# Patient Record
Sex: FEMALE | Race: WHITE | Employment: OTHER | URBAN - METROPOLITAN AREA
[De-identification: names, ages, dates, MRNs, and addresses within clinical notes are randomized per-mention and may not be internally consistent; named-entity substitution may affect disease eponyms.]

---

## 2017-03-20 DIAGNOSIS — Z12.39 SCREENING FOR BREAST CANCER: Primary | ICD-10-CM

## 2017-05-12 DIAGNOSIS — Z12.39 SCREENING FOR BREAST CANCER: ICD-10-CM

## 2017-12-18 ENCOUNTER — OFFICE VISIT (OUTPATIENT)
Dept: OBGYN | Age: 62
End: 2017-12-18
Payer: MEDICARE

## 2017-12-18 ENCOUNTER — HOSPITAL ENCOUNTER (OUTPATIENT)
Age: 62
Setting detail: SPECIMEN
Discharge: HOME | End: 2017-12-18
Payer: MEDICARE

## 2017-12-18 VITALS
BODY MASS INDEX: 34.47 KG/M2 | SYSTOLIC BLOOD PRESSURE: 138 MMHG | HEIGHT: 59 IN | DIASTOLIC BLOOD PRESSURE: 78 MMHG | WEIGHT: 171 LBS

## 2017-12-18 DIAGNOSIS — Z01.419 WOMEN'S ANNUAL ROUTINE GYNECOLOGICAL EXAMINATION: ICD-10-CM

## 2017-12-18 DIAGNOSIS — Z01.419 WOMEN'S ANNUAL ROUTINE GYNECOLOGICAL EXAMINATION: Primary | ICD-10-CM

## 2017-12-18 PROCEDURE — G0145 SCR C/V CYTO,THINLAYER,RESCR: HCPCS

## 2017-12-18 PROCEDURE — G0101 CA SCREEN;PELVIC/BREAST EXAM: HCPCS | Performed by: OBSTETRICS & GYNECOLOGY

## 2017-12-18 ASSESSMENT — PATIENT HEALTH QUESTIONNAIRE - PHQ9
SUM OF ALL RESPONSES TO PHQ QUESTIONS 1-9: 0
1. LITTLE INTEREST OR PLEASURE IN DOING THINGS: 0
2. FEELING DOWN, DEPRESSED OR HOPELESS: 0
SUM OF ALL RESPONSES TO PHQ9 QUESTIONS 1 & 2: 0

## 2017-12-18 NOTE — PROGRESS NOTES
MEDICARE PHYSICAL    Date of service: 2017    Rica Spatz  Is a 58 y.o.  female    PT's PCP is: No primary care provider on file. : 1955     HIGH RISK ASSESSMENT    Maternal NORMAN Exposure (in utero): No    Sexual activity < 16 yrs of age: No    Greater than 5 sexual partners: No    3 abnormal paps in the last 7 years: No    History of any STD (including HIV): No                                        Subjective:       No LMP recorded.  Patient is postmenopausal.     Are your menses regular: not applicable    OB History    Para Term  AB Living   2 2 2         SAB TAB Ectopic Molar Multiple Live Births                    # Outcome Date GA Lbr José Miguel/2nd Weight Sex Delivery Anes PTL Lv   2 Term 46 38w0d   F CS-Unspec      1 Term 12 37w0d   F CS-Unspec              History   Smoking Status    Current Every Day Smoker    Packs/day: 1.00    Years: 30.00   Smokeless Tobacco    Never Used        History   Alcohol Use No       Allergies: Codeine; Morphine and related; and Rofecoxib      Current Outpatient Prescriptions:     LINZESS 290 MCG CAPS capsule, TAKE 1 CAPSULE BY MOUTH DAILY, Disp: , Rfl: 5    PREVNAR 13 SUSP inj, ADMINISTERED AT DDM, Disp: , Rfl: 0    temazepam (RESTORIL) 15 MG capsule, , Disp: , Rfl: 0    baclofen (LIORESAL) 10 MG tablet, , Disp: , Rfl: 0    buPROPion (WELLBUTRIN XL) 300 MG XL tablet, , Disp: , Rfl: 0    losartan (COZAAR) 50 MG tablet, , Disp: , Rfl:     meloxicam (MOBIC) 15 MG tablet, , Disp: , Rfl: 0    ZOSTAVAX 03558 UNT/0.65ML injection, , Disp: , Rfl: 0    famotidine (PEPCID) 20 MG tablet, , Disp: , Rfl:     levothyroxine (SYNTHROID) 75 MCG tablet, , Disp: , Rfl:     pravastatin (PRAVACHOL) 40 MG tablet, , Disp: , Rfl:     clomiPRAMINE (ANAFRANIL) 50 MG capsule, Take 50 mg by mouth nightly., Disp: , Rfl:     ALPRAZolam (XANAX) 0.25 MG tablet, Take 0.25 mg by mouth nightly as needed for Sleep., Disp: , Rfl:     aspirin 81 MG tablet, Take 81 mg by mouth daily. , Disp: , Rfl:     SYMBICORT 160-4.5 MCG/ACT AERO, INHALE 2 PUFFS TWICE DAILY, Disp: , Rfl: 5    lidocaine (XYLOCAINE) 5 % ointment, , Disp: , Rfl:     vitamin D (CHOLECALCIFEROL) 1000 UNIT TABS tablet, Take 1,000 Units by mouth daily. , Disp: , Rfl:     History   Sexual Activity    Sexual activity: Not on file       Any bleeding or pain with intercourse: No    Last Yearly: 2016    Last Mammogram: 2017    Last Dexascan:unknown    Do you do self breast exams: Yes    Past Medical History:   Diagnosis Date    Anxiety and depression     High cholesterol     Hypertension     Thyroid disease        Past Surgical History:   Procedure Laterality Date     SECTION      CHOLECYSTECTOMY      KNEE SURGERY  2016    LAPAROSCOPY         History reviewed. No pertinent family history. Any family history of breast or ovarian cancer: No    Any family history of blood clots: No      Chief Complaint   Patient presents with    Gynecologic Exam        Nurse:ARCHANA    PE:  Vital Signs  Blood pressure 138/78, height 4' 11\" (1.499 m), weight 171 lb (77.6 kg). Labs:    No results found for this visit on 17. HPI: The patient is here today for a yearly exam.  She is having no problems or complaints at this time. NoPT denies fever, chills, nausea and vomiting       Objective  Lymphatic:   no lymphadenopathy  Heent:   negative   Cor: regular rate and rhythm, no murmurs              Pul:clear to auscultation bilaterally- no wheezes, rales or rhonchi, normal air movement, no respiratory distress      GI: Abdomen soft, non-tender.  BS normal. No masses,  No organomegaly           Extremities: normal strength, tone, and muscle mass   Breasts: Breast:normal appearance, no masses or tenderness, Inspection negative, No nipple retraction or dimpling, No nipple discharge or bleeding, No axillary or supraclavicular adenopathy, Normal to palpation without dominant masses   Pelvic Exam: GENITAL/URINARY:  External Genitalia:  General appearance; normal, Hair distribution; normal, Lesions absent  Vagina:  General appearance normal, Discharge absent, Lesions absent, Pelvic support normal, vagina very narrowed  Cervix:  General appearance normal, Lesions absent, Discharge absent, Tenderness absent, Enlargement absent, Nodularity absent  Uterus:  Size normal, Contour normal, Position normal, Masses absent, Consistency; normal, Support normal, Tenderness absent  Adenexa: Masses absent, Tenderness absent, Enlargement absent, Nodularity absent                                    Vaginal discharge: no vaginal discharge                            She was also counseled on her preventative health maintenance recommendations and follow-up. Assessment and Plan       1. Women's annual routine gynecological examination  PAP SMEAR             I am having Ms. Hunter maintain her famotidine, levothyroxine, pravastatin, vitamin D, clomiPRAMINE, ALPRAZolam, aspirin, baclofen, buPROPion, losartan, meloxicam, ZOSTAVAX, SYMBICORT, lidocaine, LINZESS, PREVNAR 13, and temazepam.    Return in about 3 years (around 12/18/2020). There are no Patient Instructions on file for this visit.        Aruna Skaggs,12/18/2017 4:20 PM

## 2017-12-29 LAB — CYTOLOGY REPORT: NORMAL

## 2019-04-29 DIAGNOSIS — Z12.39 SCREENING FOR BREAST CANCER: Primary | ICD-10-CM

## 2019-07-03 DIAGNOSIS — Z12.39 SCREENING FOR BREAST CANCER: ICD-10-CM

## 2021-09-09 ENCOUNTER — OFFICE VISIT (OUTPATIENT)
Dept: OBGYN | Age: 66
End: 2021-09-09
Payer: COMMERCIAL

## 2021-09-09 VITALS
SYSTOLIC BLOOD PRESSURE: 132 MMHG | WEIGHT: 178 LBS | DIASTOLIC BLOOD PRESSURE: 84 MMHG | HEIGHT: 59 IN | BODY MASS INDEX: 35.88 KG/M2

## 2021-09-09 DIAGNOSIS — B35.6 TINEA CRURIS: Primary | ICD-10-CM

## 2021-09-09 PROCEDURE — 99213 OFFICE O/P EST LOW 20 MIN: CPT | Performed by: OBSTETRICS & GYNECOLOGY

## 2021-09-09 RX ORDER — NYSTATIN 100000 [USP'U]/G
POWDER TOPICAL
Qty: 1 EACH | Refills: 5 | Status: SHIPPED | OUTPATIENT
Start: 2021-09-09 | End: 2022-09-08

## 2021-09-09 RX ORDER — FLUCONAZOLE 100 MG/1
100 TABLET ORAL DAILY
Qty: 14 TABLET | Refills: 0 | Status: SHIPPED | OUTPATIENT
Start: 2021-09-09 | End: 2021-09-23

## 2021-09-09 NOTE — PROGRESS NOTES
PROBLEM VISIT     Date of service: 2021    Piter Mora  Is a 77 y.o.  female    PT's PCP is: Rochelle Liu MD     : 1955                                             Subjective:       No LMP recorded. Patient is postmenopausal.   OB History    Para Term  AB Living   2 2 2         SAB TAB Ectopic Molar Multiple Live Births                    # Outcome Date GA Lbr José Miguel/2nd Weight Sex Delivery Anes PTL Lv   2 Term 46 38w0d   F CS-Unspec      1 Term 12 37w0d   F CS-Unspec           Social History     Tobacco Use   Smoking Status Current Every Day Smoker    Packs/day: 1.00    Years: 30.00    Pack years: 30.00   Smokeless Tobacco Never Used        Social History     Substance and Sexual Activity   Alcohol Use No       Allergies: Codeine, Morphine and related, and Rofecoxib      Current Outpatient Medications:     LINZESS 290 MCG CAPS capsule, TAKE 1 CAPSULE BY MOUTH DAILY, Disp: , Rfl: 5    temazepam (RESTORIL) 15 MG capsule, , Disp: , Rfl: 0    baclofen (LIORESAL) 10 MG tablet, , Disp: , Rfl: 0    buPROPion (WELLBUTRIN XL) 300 MG XL tablet, , Disp: , Rfl: 0    famotidine (PEPCID) 20 MG tablet, , Disp: , Rfl:     levothyroxine (SYNTHROID) 75 MCG tablet, , Disp: , Rfl:     pravastatin (PRAVACHOL) 40 MG tablet, , Disp: , Rfl:     clomiPRAMINE (ANAFRANIL) 50 MG capsule, Take 50 mg by mouth nightly., Disp: , Rfl:     aspirin 81 MG tablet, Take 81 mg by mouth daily. , Disp: , Rfl:     SYMBICORT 160-4.5 MCG/ACT AERO, INHALE 2 PUFFS TWICE DAILY (Patient not taking: Reported on 2021), Disp: , Rfl: 5    lidocaine (XYLOCAINE) 5 % ointment, , Disp: , Rfl:     PREVNAR 13 SUSP inj, ADMINISTERED AT DDM (Patient not taking: Reported on 2021), Disp: , Rfl: 0    losartan (COZAAR) 50 MG tablet, , Disp: , Rfl:     meloxicam (MOBIC) 15 MG tablet, , Disp: , Rfl: 0    ZOSTAVAX 98959 UNT/0.65ML injection, , Disp: , Rfl: 0    vitamin D (CHOLECALCIFEROL) 1000 UNIT TABS tablet, Take 1,000 Units by mouth daily. (Patient not taking: Reported on 9/9/2021), Disp: , Rfl:     ALPRAZolam (XANAX) 0.25 MG tablet, Take 0.25 mg by mouth nightly as needed for Sleep. (Patient not taking: Reported on 9/9/2021), Disp: , Rfl:     Social History     Substance and Sexual Activity   Sexual Activity Not on file       Last Yearly:  12/18/17    Last pap: 12/18/17    Last HPV: never    Chief Complaint   Patient presents with    Rash     pt presents with c/o rash in the daniela area that she has had for about a week - she has tried multiple things without relief. NURSE: mariel    PE:  Vital Signs  Blood pressure 132/84, height 4' 11\" (1.499 m), weight 178 lb (80.7 kg). Labs:    No results found for this visit on 09/09/21. NURSE: geovanny    HPI: Patient is here complaining of severe itching. This is in the perineal area. The patient does admit to having a rash in the abdominal fold and in the groin. He has tried numerous over-the-counter and prescription products for this    No  PT denies fever, chills, nausea and vomiting       Objective: The patient has extensive intertrigo tinea cruris and this extends all over the perineum the perianal area covering both vulva and the perivulvar areas. Assessment and Plan: Due to the severity of the and extent of the intertrigo and tinea cruris will place her on nightly Diflucan for 2 weeks and put her on nystatin powder 3 times a day until rash is cleared. I did explain the importance then of using cornstarch baby powder or other similar powder over these areas once the infection is cleared          Diagnosis Orders   1. Tinea cruris  fluconazole (DIFLUCAN) 100 MG tablet    nystatin (MYCOSTATIN) 261598 UNIT/GM powder             No follow-ups on file. FF: 20 minutes    There are no Patient Instructions on file for this visit.     Over 75%of time spent on counseling and care coordination on: see assessment and plan,  She was also counseled on her preventative health maintenance recommendations and follow-up.       Chester Doty MD,9/9/2021 4:08 PM

## 2022-09-08 ENCOUNTER — HOSPITAL ENCOUNTER (OUTPATIENT)
Age: 67
Setting detail: SPECIMEN
Discharge: HOME | End: 2022-09-08
Payer: MEDICARE

## 2022-09-08 ENCOUNTER — OFFICE VISIT (OUTPATIENT)
Dept: OBGYN | Age: 67
End: 2022-09-08
Payer: MEDICARE

## 2022-09-08 VITALS
WEIGHT: 160 LBS | HEIGHT: 59 IN | BODY MASS INDEX: 32.25 KG/M2 | DIASTOLIC BLOOD PRESSURE: 68 MMHG | SYSTOLIC BLOOD PRESSURE: 120 MMHG

## 2022-09-08 DIAGNOSIS — Z01.419 WOMEN'S ANNUAL ROUTINE GYNECOLOGICAL EXAMINATION: ICD-10-CM

## 2022-09-08 DIAGNOSIS — Z12.31 SCREENING MAMMOGRAM, ENCOUNTER FOR: Primary | ICD-10-CM

## 2022-09-08 PROCEDURE — 99397 PER PM REEVAL EST PAT 65+ YR: CPT | Performed by: OBSTETRICS & GYNECOLOGY

## 2022-09-08 PROCEDURE — G0145 SCR C/V CYTO,THINLAYER,RESCR: HCPCS

## 2022-09-08 RX ORDER — HYDROCODONE BITARTRATE AND ACETAMINOPHEN 5; 325 MG/1; MG/1
TABLET ORAL
COMMUNITY
Start: 2022-09-07 | End: 2022-09-08

## 2022-09-08 NOTE — PROGRESS NOTES
YEARLY PHYSICAL    Date of service: 2022    Rosezena Dancer  Is a 79 y.o.   female    PT's PCP is: Te Valdovinos MD     : 1955                                             Subjective:       No LMP recorded. Patient is postmenopausal.     Are your menses regular: not applicable    OB History    Para Term  AB Living   2 2 2         SAB IAB Ectopic Molar Multiple Live Births                    # Outcome Date GA Lbr José Miguel/2nd Weight Sex Delivery Anes PTL Lv   2 Term 46 38w0d   F CS-Unspec      1 Term 12 37w0d   F CS-Unspec           Social History     Tobacco Use   Smoking Status Every Day    Packs/day: 1.00    Years: 30.00    Pack years: 30.00    Types: Cigarettes   Smokeless Tobacco Never        Social History     Substance and Sexual Activity   Alcohol Use No       No family history on file. Allergies: Codeine, Morphine and related, and Rofecoxib      Current Outpatient Medications:     LINZESS 290 MCG CAPS capsule, TAKE 1 CAPSULE BY MOUTH DAILY, Disp: , Rfl: 5    temazepam (RESTORIL) 15 MG capsule, , Disp: , Rfl: 0    baclofen (LIORESAL) 10 MG tablet, , Disp: , Rfl: 0    buPROPion (WELLBUTRIN XL) 300 MG XL tablet, , Disp: , Rfl: 0    famotidine (PEPCID) 20 MG tablet, , Disp: , Rfl:     levothyroxine (SYNTHROID) 75 MCG tablet, , Disp: , Rfl:     pravastatin (PRAVACHOL) 40 MG tablet, , Disp: , Rfl:     clomiPRAMINE (ANAFRANIL) 50 MG capsule, Take 50 mg by mouth nightly., Disp: , Rfl:     aspirin 81 MG tablet, Take 81 mg by mouth daily. , Disp: , Rfl:     nystatin (MYCOSTATIN) 951349 UNIT/GM powder, Apply 3 times daily as needed for skin rash (Patient not taking: Reported on 2022), Disp: 1 each, Rfl: 5    SYMBICORT 160-4.5 MCG/ACT AERO, INHALE 2 PUFFS TWICE DAILY (Patient not taking: No sig reported), Disp: , Rfl: 5    lidocaine (XYLOCAINE) 5 % ointment, , Disp: , Rfl:     PREVNAR 13 SUSP inj, ADMINISTERED AT DDM (Patient not taking: No sig reported), Disp: , Rfl: 0    losartan (COZAAR) 50 MG tablet, , Disp: , Rfl:     meloxicam (MOBIC) 15 MG tablet, , Disp: , Rfl: 0    ZOSTAVAX 53092 UNT/0.65ML injection, , Disp: , Rfl: 0    vitamin D (CHOLECALCIFEROL) 1000 UNIT TABS tablet, Take 1,000 Units by mouth daily. (Patient not taking: No sig reported), Disp: , Rfl:     ALPRAZolam (XANAX) 0.25 MG tablet, Take 0.25 mg by mouth nightly as needed for Sleep. (Patient not taking: No sig reported), Disp: , Rfl:     Social History     Substance and Sexual Activity   Sexual Activity Yes    Partners: Male    Birth control/protection: Post-menopausal       Any bleeding or pain with intercourse: No    Last Yearly:  17    Last pap: 17    Last HPV: never    Last Mammogram: 20    Last Dexascan never    Do you do self breast exams: No    Past Medical History:   Diagnosis Date    Anxiety and depression     High cholesterol     Hypertension     Thyroid disease        Past Surgical History:   Procedure Laterality Date     SECTION      CHOLECYSTECTOMY      KNEE SURGERY  2016    LAPAROSCOPY         No family history on file. Any family history of breast or ovarian cancer: No    Any family history of blood clots: No      Chief Complaint   Patient presents with    Gynecologic Exam     Pt presents today for routine gyn exam. Last pap was 17. Nurse: LMD  PE:  Vital Signs  Blood pressure 120/68, height 4' 11\" (1.499 m), weight 160 lb (72.6 kg). Labs:    No results found for this visit on 22.     HPI: Patient is here wishing to have a yearly exam.  She denies any GYN problems at this time    YesPT denies fever, chills, nausea and vomiting       Objective  Lymphatic:   no lymphadenopathy  Heent:   negative   Cor: regular rate and rhythm, no murmurs              Pul:clear to auscultation bilaterally- no wheezes, rales or rhonchi, normal air movement, no respiratory distress      GI: Abdomen soft, non-tender. BS normal. No masses,  No organomegaly, old well-healed lower abdominal scar           Extremities: normal strength, tone, and muscle mass   Breasts: Breast:normal appearance, no masses or tenderness, Inspection negative, No nipple retraction or dimpling, No nipple discharge or bleeding, No axillary or supraclavicular adenopathy, Normal to palpation without dominant masses   Pelvic Exam: GENITAL/URINARY:  External Genitalia:  General appearance; normal, Hair distribution; normal, Lesions absent  Vagina:  General appearance normal, Discharge absent, Lesions absent, Pelvic support normal  Cervix:  General appearance normal, Lesions absent, Discharge absent, Tenderness absent, Enlargement absent, Nodularity absent  Uterus:  Size normal, Contour normal, Position normal, Masses absent, Consistency; normal, Support normal, Tenderness absent  Adenexa: Masses absent, Tenderness absent, Enlargement absent, Nodularity absent                                    Vaginal discharge: no vaginal discharge                            She was also counseled on her preventative health maintenance recommendations and follow-up. Assessment and Plan: Routine care        Diagnosis Orders   1. Screening mammogram, encounter for  MIKE NOBLE DIGITAL SCREEN BILATERAL      2. Women's annual routine gynecological examination  PAP SMEAR                I have discontinued Carrol Hunter's vitamin D, ALPRAZolam, losartan, meloxicam, Zostavax, Symbicort, lidocaine, Prevnar 13, nystatin, and HYDROcodone-acetaminophen. I am also having her maintain her famotidine, levothyroxine, pravastatin, clomiPRAMINE, aspirin, baclofen, buPROPion, Linzess, and temazepam.    No follow-ups on file. There are no Patient Instructions on file for this visit.        Beth Kraus MD,9/8/2022 2:04 PM

## 2022-09-15 LAB — CYTOLOGY REPORT: NORMAL

## 2022-10-14 ENCOUNTER — TELEPHONE (OUTPATIENT)
Dept: OBGYN | Age: 67
End: 2022-10-14

## 2022-11-23 ENCOUNTER — OFFICE VISIT (OUTPATIENT)
Dept: OBGYN | Age: 67
End: 2022-11-23
Payer: MEDICARE

## 2022-11-23 ENCOUNTER — HOSPITAL ENCOUNTER (OUTPATIENT)
Age: 67
Setting detail: SPECIMEN
Discharge: HOME | End: 2022-11-23
Payer: MEDICARE

## 2022-11-23 VITALS
WEIGHT: 159 LBS | BODY MASS INDEX: 32.05 KG/M2 | HEIGHT: 59 IN | SYSTOLIC BLOOD PRESSURE: 120 MMHG | DIASTOLIC BLOOD PRESSURE: 68 MMHG

## 2022-11-23 DIAGNOSIS — N95.0 PMB (POSTMENOPAUSAL BLEEDING): ICD-10-CM

## 2022-11-23 DIAGNOSIS — N95.0 PMB (POSTMENOPAUSAL BLEEDING): Primary | ICD-10-CM

## 2022-11-23 PROCEDURE — 88305 TISSUE EXAM BY PATHOLOGIST: CPT

## 2022-11-23 PROCEDURE — 58100 BIOPSY OF UTERUS LINING: CPT | Performed by: OBSTETRICS & GYNECOLOGY

## 2022-11-23 RX ORDER — HYDROCODONE BITARTRATE AND ACETAMINOPHEN 5; 325 MG/1; MG/1
TABLET ORAL
COMMUNITY
Start: 2022-11-10

## 2022-11-23 NOTE — PROGRESS NOTES
PROBLEM VISIT     Date of service: 2022    Jr Smyth  Is a 79 y.o.  female    PT's PCP is: Winter Keene MD     : 1955                                             Subjective:       No LMP recorded. Patient is postmenopausal.   OB History    Para Term  AB Living   2 2 2         SAB IAB Ectopic Molar Multiple Live Births                    # Outcome Date GA Lbr José Miguel/2nd Weight Sex Delivery Anes PTL Lv   2 Term 46 38w0d   F CS-Unspec      1 Term 12 37w0d   F CS-Unspec           Social History     Tobacco Use   Smoking Status Every Day    Packs/day: 1.00    Years: 30.00    Pack years: 30.00    Types: Cigarettes   Smokeless Tobacco Never        Social History     Substance and Sexual Activity   Alcohol Use No       Allergies: Codeine, Morphine and related, and Rofecoxib      Current Outpatient Medications:     HYDROcodone-acetaminophen (NORCO) 5-325 MG per tablet, TAKE 1 TABLET BY MOUTH EVERY 6 HOURS AS NEEDED, Disp: , Rfl:     LINZESS 290 MCG CAPS capsule, TAKE 1 CAPSULE BY MOUTH DAILY, Disp: , Rfl: 5    temazepam (RESTORIL) 15 MG capsule, , Disp: , Rfl: 0    baclofen (LIORESAL) 10 MG tablet, , Disp: , Rfl: 0    buPROPion (WELLBUTRIN XL) 300 MG XL tablet, , Disp: , Rfl: 0    famotidine (PEPCID) 20 MG tablet, , Disp: , Rfl:     levothyroxine (SYNTHROID) 75 MCG tablet, , Disp: , Rfl:     pravastatin (PRAVACHOL) 40 MG tablet, , Disp: , Rfl:     clomiPRAMINE (ANAFRANIL) 50 MG capsule, Take 50 mg by mouth nightly., Disp: , Rfl:     aspirin 81 MG tablet, Take 81 mg by mouth daily. , Disp: , Rfl:     Social History     Substance and Sexual Activity   Sexual Activity Yes    Partners: Male    Birth control/protection: Post-menopausal       Last Yearly:  22    Last pap: 22    Last HPV: never    Chief Complaint   Patient presents with    Vaginal Bleeding     Pt is here today for PMB, bleeding was yesterday and only lasted throughout the day.  Pt has been having left side cramping into the back area. NURSE: LMD    PE:  Vital Signs  Blood pressure 120/68, height 4' 11\" (1.499 m), weight 159 lb (72.1 kg). Labs:    No results found for this visit on 11/23/22. NURSE: Mallory Lamas    HPI: The patient is here as she had an episode of postmenopausal bleeding. It was relatively heavy and lasted for 1 day    Yes  PT denies fever, chills, nausea and vomiting       Objective: Vulva vagina and cervix all have been normal and as expected atrophic appearance. The cervix was thoroughly painted with Betadine was able to place the [place] to a depth of 6.5 cm. Minimal tissue was noted in the P[place]                           Assessment and Plan: We will schedule GYN ultrasound and follow-up visit to review results and determine treatment          Diagnosis Orders   1. PMB (postmenopausal bleeding)  OK BIOPSY OF UTERUS LINING    Surgical Pathology                No follow-ups on file. FF: 15 minutes    There are no Patient Instructions on file for this visit. Over  25% of time spent on counseling and care coordination on: see assessment and plan,  She was also counseled on her preventative health maintenance recommendations and follow-up.       Heidy Anne MD,11/23/2022 2:22 PM

## 2022-12-01 ENCOUNTER — OFFICE VISIT (OUTPATIENT)
Dept: OBGYN | Age: 67
End: 2022-12-01

## 2022-12-01 VITALS
BODY MASS INDEX: 32.01 KG/M2 | DIASTOLIC BLOOD PRESSURE: 72 MMHG | HEIGHT: 59 IN | SYSTOLIC BLOOD PRESSURE: 122 MMHG | WEIGHT: 158.8 LBS

## 2022-12-01 DIAGNOSIS — N95.0 PMB (POSTMENOPAUSAL BLEEDING): Primary | ICD-10-CM

## 2022-12-01 NOTE — PROGRESS NOTES
PROBLEM VISIT     Date of service: 2022    Alaina Livingston  Is a 79 y.o.  female    PT's PCP is: Bailey Nunez MD     : 1955                                             Subjective:       No LMP recorded. Patient is postmenopausal.     OB History    Para Term  AB Living   2 2 2         SAB IAB Ectopic Molar Multiple Live Births                    # Outcome Date GA Lbr José Miguel/2nd Weight Sex Delivery Anes PTL Lv   2 Term 46 38w0d   F CS-Unspec      1 Term 12 37w0d   F CS-Unspec           Social History     Tobacco Use   Smoking Status Every Day    Packs/day: 1.00    Years: 30.00    Pack years: 30.00    Types: Cigarettes   Smokeless Tobacco Never        Social History     Substance and Sexual Activity   Alcohol Use No       Social History     Substance and Sexual Activity   Sexual Activity Yes    Partners: Male    Birth control/protection: Post-menopausal       Allergies: Codeine, Morphine and related, and Rofecoxib    Chief Complaint   Patient presents with    Results     Pt is here today for in house gyn usn results due to PMB. Last Yearly:  22    Last pap: 22    Last HPV: never      NURSE: LMD    PE:  Vital Signs  Blood pressure 122/72, height 4' 11\" (1.499 m), weight 158 lb 12.8 oz (72 kg). Labs:    No results found for this visit on 22. NURSE: Gilford Boroughs    HPI: The patient is here today for a follow-up ultrasound after an episode of postmenopausal bleeding. He has had no further bleeding    Yes  PT denies fever, chills, nausea and vomiting       Objective: Endometrial biopsy was found to be negative with minimal tissue. Ultrasound shows an endometrial thickness of 4.7 mm                           Assessment and Plan: We will continue to observe patient as we believe this to be atrophic bleeding. Diagnosis Orders   1. PMB (postmenopausal bleeding)                  No follow-ups on file.     FF: 10 minutes    There are no Patient Instructions on file for this visit. Over 75%of time spent on counseling and care coordination on: see assessment and plan,  She was also counseled on her preventative health maintenance recommendations and follow-up.       Chad Barreto MD,12/1/2022 3:20 PM

## 2023-07-12 ENCOUNTER — HOSPITAL ENCOUNTER
Dept: HOSPITAL 101 - LAB | Age: 68
Discharge: HOME | End: 2023-07-12
Payer: MEDICARE

## 2023-07-12 DIAGNOSIS — Z79.899: ICD-10-CM

## 2023-07-12 DIAGNOSIS — R73.03: ICD-10-CM

## 2023-07-12 DIAGNOSIS — E03.9: ICD-10-CM

## 2023-07-12 DIAGNOSIS — I10: Primary | ICD-10-CM

## 2023-07-12 DIAGNOSIS — E78.5: ICD-10-CM

## 2023-07-12 LAB
ADD MANUAL DIFF: NO
ALANINE AMINOTRANSFERASE: 40 U/L (ref 14–59)
ALBUMIN GLOBULIN RATIO: 1
ALBUMIN LEVEL: 4.1 G/DL (ref 3.4–5)
ALKALINE PHOSPHATASE: 147 U/L (ref 46–116)
ANION GAP: 13.4
ASPARTATE AMINO TRANSFERASE: 23 U/L (ref 15–37)
BLOOD UREA NITROGEN: 12 MG/DL (ref 7–18)
CALCIUM: 10.2 MG/DL (ref 8.5–10.1)
CARBON DIOXIDE: 26.7 MMOL/L (ref 21–32)
CHLORIDE: 103 MMOL/L (ref 98–107)
CHOLESTEROL: 231 MG/DL (ref ?–200)
CO2 BLD-SCNC: 26.7 MMOL/L (ref 21–32)
ESTIMATED GFR (AFRICAN AMERICA: >60 (ref 60–?)
ESTIMATED GFR (NON-AFRICAN AME: 59 (ref 60–?)
FREE T3: 2.34 PG/ML (ref 2.18–3.98)
GLOBULIN: 4 G/DL
GLUCOSE BLD-MCNC: 98 MG/DL (ref 74–106)
HCT VFR BLD CALC: 46.7 % (ref 36–48)
HDL CHOLESTEROL: 50 MG/DL (ref 40–60)
HEMATOCRIT: 46.7 % (ref 36–48)
HEMOGLOBIN: 15.3 G/DL (ref 12–16)
IMMATURE GRANULOCYTES ABS AUTO: 0.03 10^3/UL (ref 0–0.03)
IMMATURE GRANULOCYTES PCT AUTO: 0.3 % (ref 0–0.5)
LYMPHOCYTES  ABSOLUTE AUTO: 3 10^3/UL (ref 1.2–3.8)
MCV RBC: 97.7 FL (ref 81–99)
MEAN CORPUSCULAR HEMOGLOBIN: 32 PG (ref 26.7–34)
MEAN CORPUSCULAR HGB CONC: 32.8 G/DL (ref 29.9–35.2)
MEAN CORPUSCULAR VOLUME: 97.7 FL (ref 81–99)
PLATELET # BLD: 226 10^3/UL (ref 150–450)
PLATELET COUNT: 226 10^3/UL (ref 150–450)
POTASSIUM SERPLBLD-SCNC: 4.1 MMOL/L (ref 3.5–5.1)
POTASSIUM: 4.1 MMOL/L (ref 3.5–5.1)
RED BLOOD COUNT: 4.78 10^6/UL (ref 4.2–5.4)
SODIUM BLD-SCNC: 139 MMOL/L (ref 136–145)
SODIUM: 139 MMOL/L (ref 136–145)
THYROID STIMULATING HORMONE: 3.51 UIU/ML (ref 0.36–3.74)
TOTAL PROTEIN: 8.1 G/DL (ref 6.4–8.2)
TRIGLYCERIDES: 167 MG/DL (ref ?–150)
VLDL CHOLESTEROL: 33.4 MG/DL
WBC # BLD: 9.3 10^3/UL (ref 4–11)
WHITE BLOOD COUNT: 9.3 10^3/UL (ref 4–11)

## 2023-07-12 PROCEDURE — 80076 HEPATIC FUNCTION PANEL: CPT

## 2023-07-12 PROCEDURE — 84443 ASSAY THYROID STIM HORMONE: CPT

## 2023-07-12 PROCEDURE — 36415 COLL VENOUS BLD VENIPUNCTURE: CPT

## 2023-07-12 PROCEDURE — 80048 BASIC METABOLIC PNL TOTAL CA: CPT

## 2023-07-12 PROCEDURE — 80061 LIPID PANEL: CPT

## 2023-07-12 PROCEDURE — 85025 COMPLETE CBC W/AUTO DIFF WBC: CPT

## 2023-07-12 PROCEDURE — 83036 HEMOGLOBIN GLYCOSYLATED A1C: CPT

## 2023-07-12 PROCEDURE — 84481 FREE ASSAY (FT-3): CPT

## 2023-07-12 PROCEDURE — 84439 ASSAY OF FREE THYROXINE: CPT

## 2024-01-07 ENCOUNTER — HOSPITAL ENCOUNTER (EMERGENCY)
Age: 69
Discharge: HOME | End: 2024-01-07
Payer: MEDICARE

## 2024-01-07 VITALS
TEMPERATURE: 98.2 F | OXYGEN SATURATION: 95 % | DIASTOLIC BLOOD PRESSURE: 87 MMHG | SYSTOLIC BLOOD PRESSURE: 153 MMHG | RESPIRATION RATE: 20 BRPM | HEART RATE: 91 BPM

## 2024-01-07 VITALS — BODY MASS INDEX: 32.3 KG/M2

## 2024-01-07 DIAGNOSIS — F17.210: ICD-10-CM

## 2024-01-07 DIAGNOSIS — B37.2: Primary | ICD-10-CM

## 2024-01-07 PROCEDURE — 99283 EMERGENCY DEPT VISIT LOW MDM: CPT

## 2024-01-07 NOTE — ED.FEMALEGU1
HPI - Female Genitourinary
General
Chief complaint: Urogenital-Female
Stated complaint: YEAST INFECTION
Time Seen by Provider: 01/07/24 17:30
Source: patient
Mode of arrival: walk-in
Limitations: no limitations
History of Present Illness
HPI Narrative: 
The patient presented to us with itching and rash in her perineal as well as rectal area that developed over the last few weeks, she is not recalling any rash specifically in that areas except for the redness and itching she also denies any fever or 
feeling sick or any chills.  The main concern that it is very itching

No other concerns
Related Data
Previous Rx's

 Medication  Instructions  Recorded
clotrimazole 1 % topical cream 1 applic topical BID 2 weeks #90 01/07/24
 grams 
loratadine 10 mg tablet (Claritin) 10 mg PO DAILY PRN itching  #10 01/07/24
 tabs 
prednisone 20 mg tablet 40 mg (2 x 20 mg) PO DAILY 4 days 01/07/24
 #8 tabs 


Allergies

Allergy/AdvReac Type Severity Reaction Status Date / Time
codeine AdvReac Severe  Verified 01/07/24 16:54



Review of Systems
ROS  
 Status of ROS 10 or more systems reviewed and unremarkable except as noted in history and below   

PFSH
PFS
Social History
Smoking status:  Heavy tobacco smoker 



Exam
Narrative
Exam Narrative: 
Nurses notes and vital signs reviewed and patient is not hypoxic.

General:  Well-appearing and in no apparent distress.  
Skin:  Warm, dry, no pallor noted.  
No rash.
Head:  Normocephalic, atraumatic.
Neck:  Supple, non-tender.
Eye:  Pupils are equal, round and EOMI. No scleral icterus.
Ears, Nose, Mouth, and Throat:  TM are clear, no nasal mucosal hypertrophy.  Oral mucosa is moist, no posterior oropharynx erythema, uvula is mid-line
Cardiovascular:  Regular Rate and Rhythm without murmur, gallop or rub.
Respiratory:  No accessory muscle use or respiratory distress.
Lungs are clear to auscultation, no wheezing, rales or rhonchi
Chest Wall:  no tenderness
Back: No midline thoracic or lumbar vertebral tenderness. No CVA tenderness
Musculoskeletal:  normal ROM, no calf or popliteal tenderness, no lower extremity edema/swelling
GI:  Abdomen is soft, non-distended.  Normal bowel sounds.
No masses appreciated.
No tenderness to palpation. No rebound, guarding, or rigidity noted.

In the perirectal area the patient have a redness with no specific rash but more of a macular redness as well as the patient have very mild redness in her perineal area there is no hotness or any signs of bacterial infection
Neurological:  A&O x4.  No cranial nerve dysfunction observed.  No truncal ataxia. Moves all extremities. Sensation intact.
Psychiatric:  Cooperative and interactive. Normal mood and affect.
Constitutional
Vital Signs, click to edit/add: 

Last Vital Signs

Temp  98.2 F   01/07/24 16:49
Pulse  91 H  01/07/24 16:49
Resp  20   01/07/24 16:49
BP  153/87 H  01/07/24 16:49
Pulse Ox  95   01/07/24 16:49
O2 Del Method  Room Air  01/07/24 16:49




Course
Vital Signs
Vital signs: 

Vital Signs

Temperature  98.2 F   01/07/24 16:49
Pulse Rate  91 H  01/07/24 16:49
Respiratory Rate  20   01/07/24 16:49
Blood Pressure  153/87 H  01/07/24 16:49
Pulse Oximetry  95   01/07/24 16:49
Oxygen Delivery Method  Room Air  01/07/24 16:49



Temperature  98.2 F   01/07/24 16:49
Pulse Rate  91 H  01/07/24 16:49
Respiratory Rate  20   01/07/24 16:49
Blood Pressure  153/87 H  01/07/24 16:49
Pulse Oximetry  95   01/07/24 16:49
Oxygen Delivery Method  Room Air  01/07/24 16:49




MDM - Female Genitourinary
MDM Narrative
Medical decision making narrative: 
Right now the patient was instructed about the importance of monitoring his symptoms want to get discharged to make sure that there is no superimposed infection that we will develop after the treatment.

The patient also was instructed to follow-up with her primary care doctor within a week for further evaluation

The patient will be treated with 5 days of prednisone as well as Benadryl in the ER and loratadine to go home as well as clotrimazole cream twice daily for the next few weeks the patient is to follow up with primary care physician in next 2-3 days 
or to return to the emergency department should any of the signs or symptoms worsen or new symptoms develop. The patient agrees with the following Diagnosis and Treatment plan and the patient will be discharged home.




Discharge Plan
Discharge
Chief Complaint: Urogenital-Female

Clinical Impression:
 Candidal skin infection


Patient Disposition: Home, Self-Care

Time of Disposition Decision: 18:06

Prescriptions / Home Meds:
New
  clotrimazole 1 % cream 
   1 applic topical BID 14 Days Qty: 90 0RF
  prednisone 20 mg tablet 
   40 mg PO DAILY 4 Days Qty: 8 0RF
  loratadine [Claritin] 10 mg tablet 
   10 mg PO DAILY PRN (Reason: itching ) Qty: 10 0RF

Instructions:  Skin Yeast Infection (ED)

Stand Alone Forms:  Portal Instructions

Referrals:
Jonas Sullivan MD [Primary Care Provider] - 1 week

## 2024-01-07 NOTE — ED_ITS
HPI - Female Genitourinary    
General    
Chief complaint: Urogenital-Female    
Stated complaint: YEAST INFECTION    
Time Seen by Provider: 01/07/24 17:30    
Source: patient    
Mode of arrival: walk-in    
Limitations: no limitations    
History of Present Illness    
HPI Narrative:     
The patient presented to us with itching and rash in her perineal as well as   
rectal area that developed over the last few weeks, she is not recalling any   
rash specifically in that areas except for the redness and itching she also   
denies any fever or feeling sick or any chills.  The main concern that it is   
very itching    
    
No other concerns    
Related Data    
                                  Previous Rx's    
    
    
    
 Medication  Instructions  Recorded    
     
clotrimazole 1 % topical cream 1 applic topical BID 2 weeks #90 01/07/24    
    
 grams     
     
loratadine 10 mg tablet (Claritin) 10 mg PO DAILY PRN itching  #10 01/07/24    
    
 tabs     
     
prednisone 20 mg tablet 40 mg (2 x 20 mg) PO DAILY 4 days 01/07/24    
    
 #8 tabs     
    
    
    
                                    Allergies    
    
    
    
Allergy/AdvReac Type Severity Reaction Status Date / Time    
     
codeine AdvReac Severe  Verified 01/07/24 16:54    
    
    
    
    
Review of Systems    
    
    
ROS      
    
 Status of ROS                          10 or more systems reviewed and unremark    
able except as noted in     
history and below       
    
    
PFSH    
PFSH    
Social History    
Smoking status:  Heavy tobacco smoker     
    
    
    
Exam    
Narrative    
Exam Narrative:     
Nurses notes and vital signs reviewed and patient is not hypoxic.    
    
General:  Well-appearing and in no apparent distress.      
Skin:  Warm, dry, no pallor noted.      
No rash.    
Head:  Normocephalic, atraumatic.    
Neck:  Supple, non-tender.    
Eye:  Pupils are equal, round and EOMI. No scleral icterus.    
Ears, Nose, Mouth, and Throat:  TM are clear, no nasal mucosal hypertrophy.    
Oral mucosa is moist, no posterior oropharynx erythema, uvula is mid-line    
Cardiovascular:  Regular Rate and Rhythm without murmur, gallop or rub.    
Respiratory:  No accessory muscle use or respiratory distress.    
Lungs are clear to auscultation, no wheezing, rales or rhonchi    
Chest Wall:  no tenderness    
Back: No midline thoracic or lumbar vertebral tenderness. No CVA tenderness    
Musculoskeletal:  normal ROM, no calf or popliteal tenderness, no lower   
extremity edema/swelling    
GI:  Abdomen is soft, non-distended.  Normal bowel sounds.    
No masses appreciated.    
No tenderness to palpation. No rebound, guarding, or rigidity noted.    
    
In the perirectal area the patient have a redness with no specific rash but more  
of a macular redness as well as the patient have very mild redness in her   
perineal area there is no hotness or any signs of bacterial infection    
Neurological:  A&O x4.  No cranial nerve dysfunction observed.  No truncal   
ataxia. Moves all extremities. Sensation intact.    
Psychiatric:  Cooperative and interactive. Normal mood and affect.    
Constitutional    
Vital Signs, click to edit/add:     
    
                                Last Vital Signs    
    
    
    
Temp  98.2 F   01/07/24 16:49    
     
Pulse  91 H  01/07/24 16:49    
     
Resp  20   01/07/24 16:49    
     
BP  153/87 H  01/07/24 16:49    
     
Pulse Ox  95   01/07/24 16:49    
     
O2 Del Method  Room Air  01/07/24 16:49    
    
    
    
    
    
Course    
Vital Signs    
Vital signs:     
    
                                   Vital Signs    
    
    
    
Temperature  98.2 F   01/07/24 16:49    
     
Pulse Rate  91 H  01/07/24 16:49    
     
Respiratory Rate  20   01/07/24 16:49    
     
Blood Pressure  153/87 H  01/07/24 16:49    
     
Pulse Oximetry  95   01/07/24 16:49    
     
Oxygen Delivery Method  Room Air  01/07/24 16:49    
    
    
                                            
    
    
    
Temperature  98.2 F   01/07/24 16:49    
     
Pulse Rate  91 H  01/07/24 16:49    
     
Respiratory Rate  20   01/07/24 16:49    
     
Blood Pressure  153/87 H  01/07/24 16:49    
     
Pulse Oximetry  95   01/07/24 16:49    
     
Oxygen Delivery Method  Room Air  01/07/24 16:49    
    
    
    
    
    
MDM - Female Genitourinary    
MDM Narrative    
Medical decision making narrative:     
Right now the patient was instructed about the importance of monitoring his   
symptoms want to get discharged to make sure that there is no superimposed infe  
ction that we will develop after the treatment.    
    
The patient also was instructed to follow-up with her primary care doctor within  
a week for further evaluation    
    
The patient will be treated with 5 days of prednisone as well as Benadryl in the  
ER and loratadine to go home as well as clotrimazole cream twice daily for the   
next few weeks the patient is to follow up with primary care physician in next   
2-3 days or to return to the emergency department should any of the signs or   
symptoms worsen or new symptoms develop. The patient agrees with the following   
Diagnosis and Treatment plan and the patient will be discharged home.    
    
    
    
    
Discharge Plan    
Discharge    
Chief Complaint: Urogenital-Female    
    
Clinical Impression:    
 Candidal skin infection    
    
    
Patient Disposition: Home, Self-Care    
    
Time of Disposition Decision: 18:06    
    
Prescriptions / Home Meds:    
New    
  clotrimazole 1 % cream     
   1 applic topical BID 14 Days Qty: 90 0RF    
  prednisone 20 mg tablet     
   40 mg PO DAILY 4 Days Qty: 8 0RF    
  loratadine [Claritin] 10 mg tablet     
   10 mg PO DAILY PRN (Reason: itching ) Qty: 10 0RF    
    
Instructions:  Skin Yeast Infection (ED)    
    
Stand Alone Forms:  Portal Instructions    
    
Referrals:    
Jonas Sullivan MD [Primary Care Provider] - 1 week

## 2024-08-13 ENCOUNTER — HOSPITAL ENCOUNTER (EMERGENCY)
Age: 69
Discharge: HOME | End: 2024-08-13
Payer: MEDICARE

## 2024-08-13 VITALS
HEART RATE: 95 BPM | OXYGEN SATURATION: 99 % | DIASTOLIC BLOOD PRESSURE: 95 MMHG | SYSTOLIC BLOOD PRESSURE: 171 MMHG | TEMPERATURE: 98.24 F

## 2024-08-13 VITALS — BODY MASS INDEX: 34.3 KG/M2

## 2024-08-13 DIAGNOSIS — F43.9: Primary | ICD-10-CM

## 2024-08-13 DIAGNOSIS — R45.4: ICD-10-CM

## 2024-08-13 DIAGNOSIS — F17.210: ICD-10-CM

## 2024-08-13 PROCEDURE — 93005 ELECTROCARDIOGRAM TRACING: CPT

## 2024-08-13 PROCEDURE — 99283 EMERGENCY DEPT VISIT LOW MDM: CPT

## 2024-08-13 NOTE — ECG_ITS
[facility] 
                                        
                                       Test Date:    2024 
Pat Name:     JOE ESCUEDRO            Department:    
Patient ID:   [id number]               Room:         - 
Gender:       Female                   Technician:    
:          [id number]               Requested By: [id number] 
Order Number: [id number]              Reading MD:    
                                 Measurements 
Intervals                              Axis           
Rate:         93                       P:            79 
MN:           158                      QRS:          -54 
QRSD:         88                       T:            68 
QT:           338                                     
QTc:          389                                     
                           Interpretive Statements 
1100 Sinus rhythm 
2630 Left anterior fascicular block 
4068 Nonspecific Twave abnormality 
8003 Consistent with pulmonary disease 
9150 **  abnormal ECG  ** 
No previous ECG available for comparison

## 2024-08-13 NOTE — ED_ITS
HPI - Psych    
General    
Chief Complaint: Psychiatric Symptoms    
Stated Complaint: Suicidal    
Time Seen by Provider: 08/13/24 15:27    
Source: Reports patient    
Mode of arrival: law enforcement    
Limitations: Reports no limitations    
History of Present Illness    
HPI Narrative:     
69-year-old female presents to the emergency department via law enforcement for   
mental health evaluation.  Patient states she has been arguing with her    
over the past couple days over family.  Today, states things got heated, she   
said something she should have said, went to room and the next moment law   
enforcement was in her home.  She states she had threatened to take a bunch of   
pills, which she continues to states she would never do.  She has 2 daughters   
and grandchildren.  She has no weapons in the house.  Voices no other   
complaints.  Denies any alcohol or drug use.  She does smoke cigarettes    
Quality:?as above    
Severity:?moderate    
Timing:?as above, improved    
Context: Normal setting and activity?    
Modifying factors:?none    
Associated symptoms: as above    
    
    
Related Data    
                                Home Medications    
    
    
    
?Medication ?Instructions ?Recorded ?Confirmed    
     
bupropion HCl 300 mg 24 hr tablet, 300 mg PO DAILY 08/13/24 08/13/24    
    
extended release       
     
clomipramine 50 mg capsule 100 mg PO DAILY 08/13/24 08/13/24    
     
famotidine 20 mg tablet 20 mg PO DAILY 08/13/24 08/13/24    
     
hydrocodone 5 mg-acetaminophen 325 1 tab PO Q8H PRN pain 08/13/24 08/13/24    
    
mg tablet       
     
levothyroxine 75 mcg tablet 150 mcg PO 08/13/24     
     
temazepam 15 mg capsule 15 mg PO BEDTIME PRN sleep 08/13/24 08/13/24    
    
    
    
                                    Allergies    
    
    
    
Allergy/AdvReac Type Severity Reaction Status Date / Time    
     
codeine AdvReac Severe  Verified 01/07/24 16:54    
    
    
    
    
Review of Systems    
    
    
ROS      
    
 Constitutional Denies: fatigue       
    
 Ears, nose, mouth, and throat Denies: nasal congestion       
    
 Cardiovascular Denies: chest pain       
    
 Respiratory Denies: shortness of breath       
    
 Gastrointestinal Denies: abdominal pain       
    
 Psychiatric Denies: anxiety, change in sleep pattern, hopelessness,   
irritability, paranoia, suicidal ideation or homicidal ideation       
    
    
PFSH    
PFS    
Social History (Reviewed 08/13/24 @ 15:51 by TIRSO Wick)    
Smoking status:  Heavy tobacco smoker     
    
    
    
Exam    
Constitutional    
Vital Signs, click to edit/add:     
    
                                Last Vital Signs    
    
    
    
Temp  98.2 F   08/13/24 15:26    
     
Pulse  95 H  08/13/24 15:26    
     
Resp  18   08/13/24 15:26    
     
BP  171/95 H  08/13/24 15:26    
     
Pulse Ox  99   08/13/24 15:26    
     
O2 Del Method  Room Air  08/13/24 15:26    
    
    
    
    
Documenting provider has reviewed patient's vital signs: yes    
Common normals: no apparent distress, oriented x3, no limitations, alert and   
well nourished    
General appearance: cooperative, comfortable and well kempt    
Orientation/consciousness: Yes awake, Yes oriented to person, Yes oriented to   
place and Yes oriented to time    
HENMT    
Common normals: normocephalic and head/scalp atraumatic    
Head and scalp: normocephalic and atraumatic    
Eye    
Common normals: conjunctivae normal    
Conjunctiva: conjunctiva(e) normal    
Other:     
States chronic history of dry eyes    
Respiratory    
Common normals: normal respiratory effort and clear to auscultation bilaterally    
Effort & inspection: able to speak in complete sentences    
Auscultation: clear to auscultation bilaterally    
Cardio    
Common normals: regular rate, regular rhythm and no murmurs    
Rate: regular rate    
Rhythm: regular rhythm    
Neuro    
Common normals: oriented x3, no focal motor deficits and no sensory deficits   
noted    
Sensorium/orientation: awake, alert, oriented to person, oriented to place and   
oriented to time    
Gait (neuro): normal gait    
Psych    
Common normals: thought process normal, cooperative, affect normal, speech   
normal, activity/motor behavior normal, denies hallucinations, denies homicidal   
ideation and denies suicidal ideation    
Appearance: well kempt    
Speech: normal speech    
Thought process: normal thought process    
    
Course    
Vital Signs    
Vital signs:     
    
                                   Vital Signs    
    
    
    
Temperature  98.2 F   08/13/24 15:26    
     
Pulse Rate  95 H  08/13/24 15:26    
     
Respiratory Rate  18   08/13/24 15:26    
     
Blood Pressure  171/95 H  08/13/24 15:26    
     
Pulse Oximetry  99   08/13/24 15:26    
     
Oxygen Delivery Method  Room Air  08/13/24 15:26    
    
    
                                            
    
    
    
Temperature  98.2 F   08/13/24 15:26    
     
Pulse Rate  95 H  08/13/24 15:26    
     
Respiratory Rate  18   08/13/24 15:26    
     
Blood Pressure  171/95 H  08/13/24 15:26    
     
Pulse Oximetry  99   08/13/24 15:26    
     
Oxygen Delivery Method  Room Air  08/13/24 15:26    
    
    
    
    
    
MDM - Psych    
MDM Narrative    
Medical decision making narrative:     
This is a pleasant 69-year-old female who presented to the emergency department   
for mental health evaluation.  She presented via police.  Reportedly, patient ha  
quiana stated she was going to take some pills.  Patient states she would never do   
that, just made a threat as she was mad at her .  States her and her   
 have been fighting over the past couple days over family.  She denies   
any prior history of suicide attempt.  She has 2 daughters and grandchildren.    
She has no access to weapons.  She denies any alcohol use.  During evaluation,   
she repeatedly states that she did not mean it.    
On arrival, afebrile, vital signs are stable    
On exam, nontoxic, well-appearing patient in no distress.  She makes good eye   
contact.  She appears as though she has been crying, but she states she has   
chronic dry eyes.  She continues to deny any suicidal or homicidal ideation.    
Heart regular rate and rhythm.  Lung sounds clear and equal bilaterally.    
    
    
Favor anger, stress reaction, threat    
Suicidal ideation less likely based on history and physical exam \    
    
she was also evaluated by ED attending who did not voice concern about patient   
being a threat to herself    
    
    
Disposition    
?    
The patient was discharged.    
    
Plan: Patient will be discharged to home.  Condition at time of disposition:   
stable    
?    
Advised to follow up with primary provider.   Advised to return for any   
worsening and/or development of new, concerning signs or symptoms    
    
    
PLEASE NOTE: Portions of the medical record may have been produced using   
electronic transcription and may contain errors with respect to translation of   
words which may not have been identified prior to finalization of the chart.    
    
    
Medical Records    
Attestation: I reviewed the patient's medical records.    
    
Discharge Plan    
Discharge    
Stand Alone Forms:  Portal Instructions    
    
Chief Complaint: Psychiatric Symptoms    
    
Clinical Impression:    
 Anger reaction, Stress reaction    
    
    
Patient Disposition: Home, Self-Care    
    
Time of Disposition Decision: 15:53    
    
Condition: Good    
    
Mode of Transportation: Private Vehicle    
    
Prescriptions / Home Meds:    
No Action    
  hydrocodone-acetaminophen 5-325 mg tablet     
   1 tab PO Q8H PRN (Reason: pain)     
  levothyroxine 75 mcg tablet     
   150 mcg PO      
   Rx Instructions:    
   Take Sunday, Monday and Tuesday    
  famotidine 20 mg tablet     
   20 mg PO DAILY     
  temazepam 15 mg capsule     
   15 mg PO BEDTIME PRN (Reason: sleep)     
  clomipramine 50 mg capsule     
   100 mg PO DAILY     
  bupropion HCl 300 mg tablet extended release 24 hr     
   300 mg PO DAILY     
    
Print Language: English    
    
Instructions:  Stress (ED)    
    
Referrals:    
Jonas Sullivan MD [Primary Care Provider] - 1 week

## 2024-08-13 NOTE — ED.PSYCH1
HPI - Psych
General
Chief Complaint: Psychiatric Symptoms
Stated Complaint: Suicidal
Time Seen by Provider: 08/13/24 15:27
Source: Reports patient
Mode of arrival: law enforcement
Limitations: Reports no limitations
History of Present Illness
HPI Narrative: 
69-year-old female presents to the emergency department via law enforcement for mental health evaluation.  Patient states she has been arguing with her  over the past couple days over family.  Today, states things got heated, she said 
something she should have said, went to room and the next moment law enforcement was in her home.  She states she had threatened to take a bunch of pills, which she continues to states she would never do.  She has 2 daughters and grandchildren.  She 
has no weapons in the house.  Voices no other complaints.  Denies any alcohol or drug use.  She does smoke cigarettes
Quality:?as above
Severity:?moderate
Timing:?as above, improved
Context: Normal setting and activity?
Modifying factors:?none
Associated symptoms: as above


Related Data
Home Medications

?Medication ?Instructions ?Recorded ?Confirmed
bupropion HCl 300 mg 24 hr tablet, 300 mg PO DAILY 08/13/24 08/13/24
extended release   
clomipramine 50 mg capsule 100 mg PO DAILY 08/13/24 08/13/24
famotidine 20 mg tablet 20 mg PO DAILY 08/13/24 08/13/24
hydrocodone 5 mg-acetaminophen 325 1 tab PO Q8H PRN pain 08/13/24 08/13/24
mg tablet   
levothyroxine 75 mcg tablet 150 mcg PO 08/13/24 
temazepam 15 mg capsule 15 mg PO BEDTIME PRN sleep 08/13/24 08/13/24


Allergies

Allergy/AdvReac Type Severity Reaction Status Date / Time
codeine AdvReac Severe  Verified 01/07/24 16:54



Review of Systems
ROS  
 Constitutional Denies: fatigue   
 Ears, nose, mouth, and throat Denies: nasal congestion   
 Cardiovascular Denies: chest pain   
 Respiratory Denies: shortness of breath   
 Gastrointestinal Denies: abdominal pain   
 Psychiatric Denies: anxiety, change in sleep pattern, hopelessness, irritability, paranoia, suicidal ideation or homicidal ideation   

PFSH
PFS
Social History (Reviewed 08/13/24 @ 15:51 by TIRSO Wick)
Smoking status:  Heavy tobacco smoker 



Exam
Constitutional
Vital Signs, click to edit/add: 

Last Vital Signs

Temp  98.2 F   08/13/24 15:26
Pulse  95 H  08/13/24 15:26
Resp  18   08/13/24 15:26
BP  171/95 H  08/13/24 15:26
Pulse Ox  99   08/13/24 15:26
O2 Del Method  Room Air  08/13/24 15:26



Documenting provider has reviewed patient's vital signs: yes
Common normals: no apparent distress, oriented x3, no limitations, alert and well nourished
General appearance: cooperative, comfortable and well kempt
Orientation/consciousness: Yes awake, Yes oriented to person, Yes oriented to place and Yes oriented to time
HENMT
Common normals: normocephalic and head/scalp atraumatic
Head and scalp: normocephalic and atraumatic
Eye
Common normals: conjunctivae normal
Conjunctiva: conjunctiva(e) normal
Other: 
States chronic history of dry eyes
Respiratory
Common normals: normal respiratory effort and clear to auscultation bilaterally
Effort & inspection: able to speak in complete sentences
Auscultation: clear to auscultation bilaterally
Cardio
Common normals: regular rate, regular rhythm and no murmurs
Rate: regular rate
Rhythm: regular rhythm
Neuro
Common normals: oriented x3, no focal motor deficits and no sensory deficits noted
Sensorium/orientation: awake, alert, oriented to person, oriented to place and oriented to time
Gait (neuro): normal gait
Psych
Common normals: thought process normal, cooperative, affect normal, speech normal, activity/motor behavior normal, denies hallucinations, denies homicidal ideation and denies suicidal ideation
Appearance: well kempt
Speech: normal speech
Thought process: normal thought process

Course
Vital Signs
Vital signs: 

Vital Signs

Temperature  98.2 F   08/13/24 15:26
Pulse Rate  95 H  08/13/24 15:26
Respiratory Rate  18   08/13/24 15:26
Blood Pressure  171/95 H  08/13/24 15:26
Pulse Oximetry  99   08/13/24 15:26
Oxygen Delivery Method  Room Air  08/13/24 15:26



Temperature  98.2 F   08/13/24 15:26
Pulse Rate  95 H  08/13/24 15:26
Respiratory Rate  18   08/13/24 15:26
Blood Pressure  171/95 H  08/13/24 15:26
Pulse Oximetry  99   08/13/24 15:26
Oxygen Delivery Method  Room Air  08/13/24 15:26




MDM - Psych
MDM Narrative
Medical decision making narrative: 
This is a pleasant 69-year-old female who presented to the emergency department for mental health evaluation.  She presented via police.  Reportedly, patient had stated she was going to take some pills.  Patient states she would never do that, just 
made a threat as she was mad at her .  States her and her  have been fighting over the past couple days over family.  She denies any prior history of suicide attempt.  She has 2 daughters and grandchildren.  She has no access to 
weapons.  She denies any alcohol use.  During evaluation, she repeatedly states that she did not mean it.
On arrival, afebrile, vital signs are stable
On exam, nontoxic, well-appearing patient in no distress.  She makes good eye contact.  She appears as though she has been crying, but she states she has chronic dry eyes.  She continues to deny any suicidal or homicidal ideation.  Heart regular 
rate and rhythm.  Lung sounds clear and equal bilaterally.


Favor anger, stress reaction, threat
Suicidal ideation less likely based on history and physical exam \

she was also evaluated by ED attending who did not voice concern about patient being a threat to herself


Disposition
?
The patient was discharged.

Plan: Patient will be discharged to home.  Condition at time of disposition: stable
?
Advised to follow up with primary provider.   Advised to return for any worsening and/or development of new, concerning signs or symptoms


PLEASE NOTE: Portions of the medical record may have been produced using electronic transcription and may contain errors with respect to translation of words which may not have been identified prior to finalization of the chart.


Medical Records
Attestation: I reviewed the patient's medical records.

Discharge Plan
Discharge
Stand Alone Forms:  Portal Instructions

Chief Complaint: Psychiatric Symptoms

Clinical Impression:
 Anger reaction, Stress reaction


Patient Disposition: Home, Self-Care

Time of Disposition Decision: 15:53

Condition: Good

Mode of Transportation: Private Vehicle

Prescriptions / Home Meds:
No Action
  hydrocodone-acetaminophen 5-325 mg tablet 
   1 tab PO Q8H PRN (Reason: pain) 
  levothyroxine 75 mcg tablet 
   150 mcg PO  
   Rx Instructions:
   Take Sunday, Monday and Tuesday
  famotidine 20 mg tablet 
   20 mg PO DAILY 
  temazepam 15 mg capsule 
   15 mg PO BEDTIME PRN (Reason: sleep) 
  clomipramine 50 mg capsule 
   100 mg PO DAILY 
  bupropion HCl 300 mg tablet extended release 24 hr 
   300 mg PO DAILY 

Print Language: English

Instructions:  Stress (ED)

Referrals:
Jonas Sullivan MD [Primary Care Provider] - 1 week

## 2024-08-13 NOTE — XMS_ITS
Comprehensive CCD (C-CDA v2.1)  
  
                           Created on: 2024  
  
  
Carol Rasheed  
External Reference #: CDR,PersonID:[id number]  
: 1955  
Sex: Female  
  
Demographics  
  
  
                                        Address             [address] [place] [address]  [address]  
   
                                        Home Phone          [phone]  
   
                                        Preferred Language  en  
   
                                        Marital Status        
   
                                        Yarsani Affiliation Unknown  
   
                                        Race                White  
   
                                        Ethnic Group        Not  or Lati  
no  
  
  
Author  
  
  
                                        Organization        [facility]  
  
  
Care Team Providers  
  
  
                                Care Team Member Name Role            Phone  
   
                                María PATEL, Jonas Jolley Primary Care Provider [phone]  
[phone]  
   
                                ANGELO, DR TARAH NOGUERA Admitting       Unavailable  
   
                                HEDGES, DR TARAH NOGUERA Attending       Unavailable  
   
                                MARÍA, DR JONAS BARRON Primary Care    Unavailable  
   
                                ALEX, DR MAC THOMPSON Consulting      Unavailable  
   
                                HEDGES, DR TARAH NOGUERA Consulting      Unavailable  
   
                                ANGELO, TARAH NOGUERA Referring       Unavailable  
   
                                NADCONSTANTINO, JONAS JOLLEY Primary Care    Unavailemily  
e  
   
                                TARAH STEPHENS Referring       Unavailable  
   
                                MARÍA, JONAS JOLLEY Primary Care    UnavailALEX Henderson  Attending       Unavailable  
   
                                MARÍA, JONAS   Attending       Unavailable  
   
                                Jonas Daniel MD Primary Care Provider [phone]  
  
  
  
Allergies  
  
  
                                                    Allergy   
Classification                          Reported   
Allergen(s)               Allergy Type              Date of   
Onset                     Reaction(s)               Facility  
   
                                                      
(4 sources)         Codeine             Drug Allergy        07-  
4                                                   [facility]  
   
                                                      
(1 source)          rofecoxib           Drug Allergy          
6                                                   [facility]  
Work Phone:   
[phone]  
   
                                                      
(1 source)                              Morphine And   
Related                                 Propensity to   
adverse   
reactions to   
drug                                      
6                                                   [facility]  
Work Phone:   
[phone]  
   
                                                      
(1 source)          Codeine             Drug Allergy          
6                                                   [facility]   
Repository  
  
  
  
Medications  
Current Medications  
  
  
  
                      Medication Drug Class(es) Dates      Sig (Normalized) Sig   
(Original)  
   
                                                    acetaminophen 325   
mg / HYDROcodone   
bitartrate 5 mg   
oral tablet  
(4 sources)               Opioid Agonist            Start:   
2024  
End:   
2024                              take 1 tablet by   
mouth four times   
daily as needed   
for pain                                HYDROcodone-acetam  
inophen (Norco)   
5-325 MG tablet   
Indications:   
Lumbar spondylosis   
Take 1 tablet by   
mouth 4 (four)   
times a day as   
needed for severe   
pain 120 tablet 0   
2024 Active  
   
                                                    aspirin 81 mg oral   
tablet  
(1 source)                              Platelet Aggregation   
Inhibitor,   
Nonsteroidal   
Anti-inflammatory   
Drug                                                take 1 tablet by   
mouth once daily                        aspirin 81 MG   
tablet   
Indications:   
Routine   
gynecological   
examination Take   
81 mg by mouth   
daily. 0 Active  
   
                                                    baclofen 10 mg oral   
tablet  
(1 source)                              gamma-Aminobutyric   
Acid-ergic Agonist                      Start:   
2015                                          baclofen   
(LIORESAL) 10 MG   
tablet  
   
                                                    24 hr buPROPion   
hydrochloride 300   
mg extended release   
oral tablet  
(4 sources)               Aminoketone               Start:   
09-                                          buPROPion   
(WELLBUTRIN XL)   
300 MG XL tablet  
   
                                                    clomiPRAMINE   
hydrochloride 50 mg   
oral capsule  
(4 sources)                             Tricyclic   
Antidepressant                                      take 2 capsules   
by mouth in the   
morning                                 clomiPRAMINE   
(Anafranil) 50 MG   
capsule Take 100   
mg by mouth in the   
morning. 0 Active  
  
  
  
                                                take 1 capsule by mouth once reina  
ly clomiPRAMINE (ANAFRANIL) 50 MG capsule   
Indications: Routine gynecological examination   
Take 50 mg by mouth nightly. 0 Active  
  
  
  
                                                    levothyroxine sodium 0.075   
mg oral tablet  
(4 sources)     l-Thyroxine     Start: 2014                 levothyroxine   
(SYNTHROID)   
75 MCG tablet  
  
  
  
                                                                levothyroxine (S  
ynthroid, Levoxyl) 75 MCG tablet 25 mcg 0 Active  
  
  
  
                                                    linaclotide 0.29   
mg oral capsule  
(4 sources)                             Guanylate Cyclase-C   
Agonist                                 Start:   
2016                              take 1   
capsule by   
mouth once   
daily                                   LINZESS 290 MCG   
CAPS capsule TAKE   
1 CAPSULE BY   
MOUTH DAILY 5   
2016 Active  
   
                                                    loratadine 10 mg   
oral tablet  
(3 sources)                                         Start:   
2024                              take 1 tablet   
by mouth in   
the morning                             Allergy Relief 10   
MG tablet Take 1   
tablet by mouth   
in the morning. 0   
2024 Active  
   
                                                    pravastatin sodium   
40 mg oral tablet  
(4 sources)                             HMG-CoA Reductase   
Inhibitor                               Start:   
2014                                          pravastatin   
(PRAVACHOL) 40 MG   
tablet  
   
                                                    temazepam 15 mg   
oral capsule  
(4 sources)               Benzodiazepine            Start:   
2023  
End: 2024                                     temazepam   
(Restoril) 15 MG   
capsule   
Indications:   
Primary insomnia   
Take 1 capsule   
(15 mg) by mouth   
as needed at   
bedtime for sleep   
90 capsule 0   
2023 Active  
  
  
  
                                Start: 10-                 temazepam (RES  
TORIL) 15 MG capsule  
  
  
  
Completed/Discontinued Medications  
  
  
  
                      Medication Drug Class(es) Dates      Sig (Normalized) Sig   
(Original)  
   
                                                    cetirizine   
hydrochloride 10 mg   
oral tablet  
(2 sources)                             Histamine-1   
Receptor   
Antagonist                              Start:   
2024  
End:   
2024                              take 1 tablet by   
mouth in the   
morning                                 cetirizine   
(ZyrTEC) 10 MG   
tablet   
Indications:   
Dermatitis Take 1   
tablet (10 mg) by   
mouth in the   
morning for 15   
days. 15 tablet 0   
2024   
Discontinued  
   
                                                    famotidine 20 mg   
oral tablet  
(6 sources)                             Histamine-2   
Receptor   
Antagonist                              Start:   
2014  
End:   
2024                              take 1 tablet by   
mouth in the   
morning                                 famotidine   
(Pepcid) 20 MG   
tablet   
Indications:   
Dermatitis ,   
Urticaria Take 1   
tablet (20 mg) by   
mouth in the   
morning and 1   
tablet (20 mg)   
before bedtime. Do   
all this for 15   
days. 30 tablet 0   
2024   
Discontinued  
   
                                                    fluconazole 150 mg   
oral tablet  
(2 sources)               Azole Antifungal          Start:   
2024  
End:   
2024                              take 1 tablet by   
mouth once                              fluconazole   
(Diflucan) 150 MG   
tablet Take 1   
tablet by mouth 1   
(one) time 0   
2024   
Discontinued  
   
                                                    predniSONE 20 mg   
oral tablet  
(2 sources)                                         Start:   
2024  
End:   
2024                                          predniSONE   
(Deltasone) 20 MG   
tablet   
Indications:   
Dermatitis 1 pill   
three times day X5   
days, 1 pill twice   
day for 3 days,   
then 1 pill daily   
for 3 days, then   
1/2 pill daily for   
3 days. Take with   
food 26 tablet 0   
2024   
Discontinued  
  
  
  
Problems  
Active Problems  
  
  
                      Problem Classification Problem    Date       Documented Da  
te Episodic/Chronic  
   
                                                    Allergic reactions  
(6 sources)                             Urticaria;   
Translations:   
[Urticaria,   
unspecified]                            Onset:   
  
4  
Resolved:   
  
4                         2024                Episodic  
   
                                                    Anxiety disorders  
(5 sources)                             Generalized anxiety   
disorder;   
Translations:   
[Generalized anxiety   
disorder]                               Onset:   
  
4                         2024                Chronic  
   
                                                    Chronic obstructive   
pulmonary disease and   
bronchiectasis  
(3 sources)                             Chronic obstructive   
lung disease;   
Translations: [Chronic   
obstructive pulmonary   
disease, unspecified]                   Onset:   
  
4                         2024                Chronic  
   
                                                    Delirium, dementia,   
and amnestic and other   
cognitive disorders  
(3 sources)                             Senile dementia;   
Translations:   
[Unspecified dementia   
without behavioral   
disturbance]                            Onset:   
  
4                         2024                Chronic  
   
                                                    Disorders of lipid   
metabolism  
(3 sources)                             Dyslipidemia;   
Translations:   
[Hyperlipidemia,   
unspecified]                            Onset:   
  
4                         2024                Chronic  
   
                                                    Esophageal disorders  
(5 sources)                             Gastroesophageal   
reflux disease without   
esophagitis;   
Translations:   
[Gastro-esophageal   
reflux disease without   
esophagitis]                            Onset:   
  
4                         2024                Chronic  
   
                                                    Essential hypertension  
(5 sources)                             Benign essential   
hypertension;   
Translations:   
[Essential (primary)   
hypertension]                           Onset:   
  
4                         2024                Chronic  
   
                                                    Menopausal disorders  
(1 source)                              Postmenopausal   
bleeding;   
Translations:   
[Postmenopausal   
bleeding]                               Onset:   
  
2                                                   Chronic  
   
                                                    Miscellaneous mental   
health disorders  
(5 sources)                             Primary insomnia;   
Translations: [Primary   
insomnia]                               Onset:   
  
4                         2024                Chronic  
   
                                                    Mood disorders  
(5 sources)                             Recurrent major   
depressive episodes,   
mild ; Translations:   
[Major depressive   
disorder, recurrent,   
mild]                                   Onset:   
  
4                         2024                Chronic  
   
                                                    Osteoarthritis  
(3 sources)                             Primary gonarthrosis,   
bilateral;   
Translations:   
[Bilateral primary   
osteoarthritis of   
knee]                                   Onset:   
  
3                         2023                Chronic  
   
                                                    Other gastrointestinal   
disorders  
(3 sources)                             Irritable bowel   
syndrome with   
diarrhea;   
Translations:   
[Irritable bowel   
syndrome with   
diarrhea]                               Onset:   
  
4                         2024                Chronic  
   
                                                    Other liver diseases  
(3 sources)                             Non-alcoholic fatty   
liver; Translations:   
[Fatty (change of)   
liver, not elsewhere   
classified]                             Onset:   
  
4                         2024                Chronic  
   
                                                    Other nervous system   
disorders  
(3 sources)                             Paresthesia of foot ;   
Translations:   
[Paresthesia of skin]                   Onset:   
  
3                         2023                Episodic  
   
                                                    Other nutritional;   
endocrine; and   
metabolic disorders  
(3 sources)                             Body mass index 30+ -   
obesity; Translations:   
[Body mass index (BMI)   
35.0-35.9, adult]                       Onset:   
  
4                         2024                Chronic  
   
                                                    Peripheral and   
visceral   
atherosclerosis  
(3 sources)                             Intermittent   
claudication;   
Translations:   
[Peripheral vascular   
disease, unspecified]                   Onset:   
  
4                         2024                Chronic  
   
                                                    Residual codes;   
unclassified  
(3 sources)                             Tobacco user;   
Translations: [Tobacco   
use]                                    Onset:   
  
4                         2024                Episodic  
   
                                                    Spondylosis;   
intervertebral disc   
disorders; other back   
problems  
(6 sources)                             Lumbar spondylosis;   
Translations:   
[Spondylosis without   
myelopathy or   
radiculopathy, lumbar   
region]                                 Onset:   
  
3                         2024                Chronic  
  
  
Past or Other Problems  
  
  
                      Problem Classification Problem    Date       Documented Da  
te Episodic/Chronic  
   
                                                    Sprains and strains  
(1 source)                              Neck sprain;   
Translations:   
[Sprain of joints   
and ligaments of   
unspecified parts   
of neck, initial   
encounter]                              Onset:   
2004                Episodic  
  
  
  
Results  
  
  
                      Test Name  Value      Interpretation Reference Range Facil  
[facility]  
   
                                                    Surgical Pathologyon   
022   
   
                                        Surgical Pathology  (NOTE)  
-- Diagnosis --  
ENDOMETRIUM, BIOPSY:  
-SCANT FRAGMENTS OF   
BENIGN COLUMNAR AND   
SQUAMOUS EPITHELIUM  
-SEE COMMENT  
-- Diagnosis Comment   
--  
Within the observed   
sections there is no   
intact endometrial   
mucosa  
present for   
evaluation. The   
specimen is consistent   
with that obtained  
from an atrophic type   
endometrium, however,   
clinical correlation   
is  
required.  
Herbie Cruz D.O.  
**Electronically   
Signed Out**  
[facility]/2022  
Clinical Information  
Pre-op Diagnosis: PMB  
Operative Findings:   
ENDOMETRIAL BX  
Source of Specimen  
A: ENDOMETRIAL BX  
Gross Description  
 CAROL RASHEED,   
UNDESIGNATED  Clear to   
white mucinous   
fragments, 1.0 x  
0.5 x 0.2 cm in   
aggregate. Entirely   
1cs. jg tm  
Microscopic   
Description  
Microscopic   
examination performed.  
SURGICAL PATHOLOGY   
CONSULTATION  
Patient Name: CAROL RASHEED  
Med Rec: [id number]  
Path Number:   
[id number]  
[facility]  
CONSULTING   
PATHOLOGISTS   
CORPORATION  
ANATOMIC PATHOLOGY  
[address] [place] [address]   
[phone]  
Fax: [phone] [facility]  
   
                                        Comment on above:   Performed By: #### P  
PPVS ####  
[facility] [employer]  
[address] [place] [phone]  
: Homero Lacey MD   
   
                                                    MG MAMM SCREEN 3D MARV CADon   
10-   
   
                                                    MG MAMM SCREEN 3D   
MARV CAD                                 Patient: CAROL RASHEED Exam Date:   
10/12/2022  
: 1955   
Gender:F MRN: [id number]  
Ordering : DR TARAH STEPHENS Admission #:   
[id number]  
Family : Order #:   
[id number]  
CLICK HERE TO VIEW   
EXAM  
  
RADIOLOGY REPORT  
  
PROCEDURE: MAMMOGRAM   
SCREENING 3D BILATERAL   
CAD  
  
COMPARISON: MG MAMM   
SCREEN MARV W CAD,   
2019. MG MAMM   
SCREEN MARV W  
CAD, 9/15/2020.  
  
INDICATIONS: Screening   
mammography  
  
Calculator Name NCI   
Breast Cancer Risk   
Assessment Tool  
5 Year Breast Cancer   
Risk 1.10%  
Lifetime Breast Cancer   
Risk 3.80%  
Personal Breast Cancer   
No  
Personal Ovarian   
Cancer No  
Treatments None  
Family Cancers   
Grandmother-maternal   
with breast cancer at   
age 58.  
  
LOCATION: [facility]  
  
BREAST COMPOSITION:   
Scattered areas   
fibroglandular   
density.  
  
FINDINGS:  
DIAGNOSTIC CATEGORY   
1--NEGATIVE. NO CHANGE   
FROM COMPARISON   
ASSESSMENT.  
Scattered   
benign-appearing   
calcifications are   
present. Scattered  
benign-appearing lymph   
nodes are present.  
  
RIGHT BREAST: No   
significant suspicious   
finding.  
  
LEFT BREAST: No   
significant suspicious   
finding.  
  
RECOMMENDATIONS:  
ROUTINE MAMMOGRAM AND   
CLINICAL EVALUATION IN   
12 MONTHS.  
  
PLEASE NOTE: A NORMAL   
MAMMOGRAM DOES NOT   
EXCLUDE THE   
POSSIBILITY OF BREAST  
CANCER. A CLINICALLY   
SUSPICIOUS PALPABLE   
LUMP SHOULD BE   
BIOPSIED.  
  
  
Dictated by: Mac Angel MD on 10/12/2022   
at 14:42  
Approved by: Mac Angel MD on 10/12/2022   
at 14:44            Normal                                  [facility]  
   
                                                    Cytologyon 2022   
   
                                        Cytology            (NOTE)  
INTERPRETATION  
Cervical material,   
(ThinPrep vial,   
Imaging-assisted   
review):  
Specimen Adequacy:  
Satisfactory for   
evaluation.  
Descriptive Diagnosis:  
Negative for   
intraepithelial lesion   
or malignancy.  
  
Cytotechnologist: KYLEE KENNY(ASCP)  
**Electronically   
Signed Out**  
/9/15/2022  
Source:  
A: Cervical material,   
(ThinPrep vial,   
Imaging-assisted   
review)  
Clinical History  
Postmenopausal  
Z01.419 Routine gyn   
exam without abnormal   
findings  
High risk HPV DNA   
testing is requested   
if the diagnosis is   
abnormal  
GYNECOLOGIC CYTOLOGY   
REPORT  
Patient Name: CAROL RASHEED  
[facility] Rec: [id number]  
Path Number: [id number]  
[facility]  
CONSULTING   
PATHOLOGISTS   
CORPORATION  
ANATOMIC PATHOLOGY  
[address] [place] [address]   
[phone]  
Fax: [phone] Normal                                  [facility]  
   
                                        Comment on above:   Performed By: #### P  
PPVP ####  
[facility]  
[address] [place] [address] [phone]  
: Homero Lacey MD   
  
  
  
Vital Signs  
  
  
                      Date Time  Vital Sign Value      Performing Clinician Adilson harrison  
   
                                                    2024   
13:          Body height         149.9 cm            Jonas Daniel MD  
Work Phone:   
[phone]                          [facility]  
   
                                                    2024   
13:                              Body mass index (BMI)   
[Ratio]                   35.55 kg/m2               Jonas Daniel MD  
Work Phone:   
[phone]                          [facility]  
   
                                                    2024   
13:          Body temperature    96.21 [degF]        Jonas Daniel MD  
Work Phone:   
[phone]                          [facility]  
   
                                                    2024   
13:          Body weight         79.83 kg            Jonas Daniel MD  
Work Phone:   
[phone]                          [facility]  
   
                                                    2024   
13:                              Diastolic blood   
pressure                  80 mm[Hg]                 Jonas Daniel MD  
Work Phone:   
[phone]                          [facility]  
   
                                                    2024   
13:          Heart rate          93 /min             Jonas Daniel MD  
Work Phone:   
[phone]                          [facility]  
   
                                                    2024   
13:                              SaO2% (BldA) [Mass   
fraction]                 92 %                      Jonas Daniel MD  
Work Phone:   
[phone]                          [facility]  
   
                                                    2024   
13:                              Systolic blood   
pressure                  140 mm[Hg]                Jonas Daniel MD  
Work Phone:   
[phone]                          [facility]  
  
  
  
Encounters  
  
  
                          Encounter Date Encounter Type Care Provider Facility  
   
                          Start: 2024 Refill       Aleida Flores [facility]  
 FM  
   
                                        Comment on above:   Lumbar spondylosis   
   
                                                    Start: 2024  
End: 2024     ambulatory          JONAS DANIEL        Not Available  
   
                                                    Start: 2024  
End: 2024                         Office outpatient visit   
25 minutes                              Jonas Daniel MD  
Work Phone:   
[phone]                          [facility]  
   
                                        Comment on above:   Benign essential hyp  
ertension (CMS/HCC) (Primary Dx);  
Lumbar spondylosis;  
MDD (major depressive disorder), recurrent episode, mild (HCC)   
(CMS/HCC);  
RED (generalized anxiety disorder) (CMS/HCC);  
Primary insomnia;  
Gastroesophageal reflux disease without esophagitis   
   
                                                    Start: 2024  
End: 2024     ambulatory          ALEX MATT       Not Available  
   
                                                    Start: 2022  
End: 2022     ambulatory          TARAH STEPHENS     [facility]  
l  
   
                                                    Start: 10-  
End: 10-     ambulatory          DR TARAH STEPHENS  Facility:  
   
                                                    Start: 2022  
End: 2022     ambulatory          TARAH STEPHENS     [facility]  
l  
   
                                        Start: 2022   Encounter for   
gynecological examination   
(general) (routine)   
without abnormal findings [facility]  
   
                                                    Start: 2022  
End: 2022                         Patient encounter   
procedure                               Jonas Daniel MD  
Work Phone:   
[phone] [facility]  
   
                                                    Start: 2022  
End: 2022                         Subsequent hospital visit   
by physician                            Jonas Daniel MD  
Work Phone:   
[phone] [facility]  
   
                                        Comment on above:   Women's annual routi  
ne gynecological examination   
  
  
  
Procedures  
  
  
                          Date         Procedure    Procedure Detail Performing   
Clinician  
   
                          Start: 2020 Mammography               Jonas ta MD  
Work Phone: [phone]  
  
  
  
Plan of Treatment  
  
  
                          Date         Care Activity Detail       Author  
   
                                                    Start: 2024  
End: 2024                         Patient encounter   
procedure                               2024 2:15 PM EDT   
Office Visit [facility] [address] [place] [id number] [phone] Jonas Daniel  [address]   
[address] [place]   
[id number] [phone]   
(Work) [phone]   
(Fax)                                   [facility]  
   
                                        Start: 2023   Medicare Annual Well  
ness   
(AWV)                                   Medicare Annual   
Wellness (AWV)                          [facility]  
   
                          Start: 2023 Influenza vaccination Influenza Vacc  
ine (#1) [facility]  
   
                                        Start: 2022   Screening for malign  
ant   
neoplasm of breast        Breast cancer screen      [facility]  
   
                          Start: 2022 Influenza vaccination Flu vaccine (#  
1) [facility]  
   
                                        Start: 2022   COVID-19 Vaccine (4   
-   
Booster for Pfizer   
series)                                 COVID-19 Vaccine (4 -   
Booster for Pfizer   
series)                                 [facility]  
   
                                        Start: 2021   Screening for malign  
ant   
neoplasm of breast        Mammogram                 [facility]  
   
                                        Start: 10-   Pneumococcal 65+ yea  
rs   
Vaccine (2 - PPSV23 or   
PCV20)                                  Pneumococcal 65+ years   
Vaccine (2 - PPSV23 or   
PCV20)                                  [facility]  
   
                                        Start: 2016   Pneumococcal Vaccine  
:   
65+ Years (2 - PPSV23 or   
PCV20)                                  Pneumococcal Vaccine:   
65+ Years (2 - PPSV23   
or PCV20)                               [facility]  
   
                                        Start: 2015   Shingles vaccine (2   
of   
3)                                      Shingles vaccine (2 of   
3)                                      [facility]  
   
                                        Start: 2010   Screening for   
osteoporosis                            DEXA (modify frequency   
per FRAX score)                         [facility]  
   
                                        Start: 2005   Screening for malign  
ant   
neoplasm of lung                        Low dose CT lung   
screening                               [facility]  
   
                                        Start: 2000   Screening for malign  
ant   
neoplasm of colon                                   [facility]  
   
                          Start: 1990 Diabetes screen Diabetes screen [facility]  
   
                                        Start: 1974   DTaP/Tdap/Td vaccine  
 (1   
- Tdap)                                 DTaP/Tdap/Td vaccine (1   
- Tdap)                                 [facility]  
   
                          Start: 1973 Hepatitis C screening Hepatitis C sc  
reen [facility]  
   
                          Start: 1967 Depression Screen Depression Screen   
[facility]  
   
                          Start: 1965 Lipid panel  Lipids       [facility]  
   
                                        Start: 1955   Screening for malign  
ant   
neoplasm of colon                                   [facility]  
   
                                                      
End: 2022                         Cytopathology procedure,   
preparation of smear,   
genital source                          [facility] Lab Routine   
Women's Annual Routine   
Gynecological   
Examination 1   
Occurrences starting   
2022 until   
2022                              [facility]  
Work Phone:   
[phone]  
   
                                        Comment on above:   1 Occurrences starti  
ng 2022 until 2022   
  
  
  
Immunizations  
  
  
                      Immunization Date Immunization Notes      Care Provider Nicolas taylor  
   
                                        12-          influenza virus   
vaccine, unspecified   
formulation                                         Jonas Daniel MD  
Work Phone:   
[phone]                          [facility]  
   
                                        10-          pneumococcal conjuga  
te   
vaccine, 13 megha Daniel MD  
Work Phone:   
[phone]                          [employer]  
Work Phone:   
[phone]  
   
                                        2016          pneumococcal conjuga  
te   
vaccine, 13 megha Daniel MD  
Work Phone:   
[phone]                          [employer]  
Work Phone:   
[phone]  
   
                          2015   zoster vaccine, live              Jonas white MD  
Work Phone:   
[phone]                          [employer]  
Work Phone:   
[phone]  
  
  
  
Payers  
  
  
                          Date         Payer Category Payer        Policy ID  
   
                                2024      Medicare        [employer]  
E MEDICARE   
[facility] MEDICARE ADVANTAGE   
[id number] 2024-Present [address]   
[address] [address] [place]   
[address]                              [id number]  
[id number]  
   
                          2021   Medicaid                  [id number]  
   
                                2021      Medicaid [employer] [place] MED[place] [place] [id number]   
2021-Present [address] [place] [address]            [id number]  
[id number]  
   
                          2018   Medicare                  [id number]   
[id number]  
[id number]  
   
                          1960   Medicaid                  [id number]   
[id number]  
[id number]  
   
                          1955   Unknown                   [id number]   
[id number]  
   
                          1955   Unknown                   [id number]   
[id number]  
   
                          1955   Unknown                   [id number]   
[id number]  
   
                          1955   Unknown                   [id number]   
[id number]  
   
                          1955   Unknown                   [id number]   
[id number]  
  
  
  
Social History  
  
  
                          Date         Type         Detail       Facility  
   
                                                    Start: 2017  
End: 2024                         Tobacco smoking status   
[facility]                      Smokes tobacco daily      [employer] Phone:   
[phone]  
   
                                       History of tobacco use Cigarette Smoker B  
[facility] Phone:   
[phone]  
   
                                                    Start: 2017  
End: 2024                         Cigarettes smoked   
current (pack per day)   
- Reported                1                         [employer] Phone:   
[phone]  
   
                                                    Start: 2017  
End: 2024                         Tobacco use and   
exposure                                Smokeless tobacco   
non-user                                [employer] Phone:   
[phone]  
   
                                Start: 2022 Alcohol intake  Current non-dr  
 of   
alcohol (finding)                       [employer] Phone:   
[phone]  
   
                          Start: 1955 Sex Assigned At Birth Not on file  B  
[facility] Phone:   
[phone]  
   
                          Start: 2024 Alcohol intake Ex-drinker (finding)   
[facility]  
   
                          Start: 2024 Tobacco use panel              [facility]  
   
                                Start: 2024 Tobacco Comment Started smokin  
g : 40   
years ; 11-20 cigs/day                  [facility]  
   
                                Start: 2024 Alcohol Comment caffine:soda 4  
 times   
daily; 3-4 cups per   
day                                     [facility]  
  
  
  
History of Present illness Narrative 2024  
 Jonas Daniel MD - 2024 2:06 PM Lorna Daniel MD - 2024 2:06 PM   
Lorna Daniel MD - 2024 2:06 PM Lorna Daniel MD - 2024 2:06 
PM EST  
  
                                Note Date & Type Note            Facility  
   
                                                    2024 History of Presen  
t   
illness Narrative                       Associated Problem(s): Primary   
insomnia  
Formatting of this note might be   
different from the original.  
Sleeping well with restoril and   
continue.  
Electronically signed by Jonas Daniel MD at 2024 2:06 PM EST  
Associated Problem(s): MDD (major   
depressive disorder), recurrent   
episode, mild (HCC) (CMS/HCC)  
Formatting of this note might be   
different from the original.  
Symptoms tolerable with wellbutrin and   
continue.  
Electronically signed by Jonas Daniel MD at 2024 2:06 PM EST  
Associated Problem(s): Lumbar   
spondylosis  
Formatting of this note might be   
different from the original.  
Pain stable and continue home   
stretches and PT exercises. Use norco   
PRN.  
Electronically signed by Jonas Daniel MD at 2024 2:06 PM EST  
Associated Problem(s):   
Gastroesophageal reflux disease   
without esophagitis  
Formatting of this note might be   
different from the original.  
Symptoms controlled with pepcid and   
continue.  
Electronically signed by Jonas Daniel MD at 2024 2:06 PM EST  
Associated Problem(s): RED   
(generalized anxiety disorder)   
(CMS/HCC)  
Formatting of this note might be   
different from the original.  
Symptoms tolerable with wellbutrin and   
continue.  
Electronically signed by Jonas Daniel MD at 2024 2:06 PM EST  
Associated Problem(s): Benign   
essential hypertension (CMS/HCC)  
Formatting of this note might be   
different from the original.  
BP controlled and monitor PRN.  
Electronically signed by Jonas Daniel MD at 2024 2:05 PM EST  
Formatting of this note is different   
from the original.  
Subjective  
Patient ID: Carol Rasheed is a 68   
y.o. female who presents for Follow-up   
(2 w).  
F/u HTN, back pain, depression,   
anxiety, insomnia, and GERD. Patient   
stable today. Rash resolved after   
treatment. Checking BP PRN and   
typically controlled. BP normal today.   
Not taking medication and BP remains   
controlled. Back pain unchanged. Pain   
in low back and across top hips.   
Occasional radiation into left gluteal   
region and down legs. Pain worse with   
walking, standing, and lifting. Using   
norco PRN and helps control pain. Able   
to stay active and complete ADLs.   
Depression controlled with wellbutrin.   
Not down or sad and feels happier.   
Anxiety stable. Not as stressed out or   
overwhelmed. Not as nervous or worry   
as much. Not as heard or irritable.   
Sleeping well with restoril. Able to   
fall asleep and stay asleep. Wakes up   
rested in am. GERD controlled with   
pepcid. Denies epigastric pain or   
burning and not waking up with   
symptoms.  
  
Review of Systems  
Respiratory: Negative for cough,   
shortness of breath and wheezing.  
Cardiovascular: Negative for chest   
pain and palpitations.  
Gastrointestinal: Negative for   
abdominal pain, diarrhea, nausea and   
vomiting.  
Genitourinary: Negative for dysuria.  
  
Objective  
Physical Exam  
Constitutional:  
General: She is not in acute distress.  
Appearance: Normal appearance.  
HENT:  
Head: Normocephalic.  
Right Ear: Tympanic membrane normal.  
Left Ear: Tympanic membrane normal.  
Eyes:  
Extraocular Movements: Extraocular   
movements intact.  
Pupils: Pupils are equal, round, and   
reactive to light.  
Cardiovascular:  
Rate and Rhythm: Normal rate and   
regular rhythm.  
Heart sounds: No murmur heard.  
No friction rub. No gallop.  
Pulmonary:  
Effort: Pulmonary effort is normal.  
Breath sounds: Normal breath sounds.   
No wheezing, rhonchi or rales.  
Abdominal:  
General: Bowel sounds are normal.   
There is no distension.  
Palpations: Abdomen is soft.  
Tenderness: There is no abdominal   
tenderness. There is no guarding or   
rebound.  
Musculoskeletal:  
Cervical back: Neck supple.  
Right lower leg: No edema.  
Left lower leg: No edema.  
Neurological:  
Mental Status: She is alert.  
  
Assessment/Plan  
Problem List Items Addressed This   
Visit  
  
Lumbar spondylosis  
Pain stable and continue home   
stretches and PT exercises. Use norco   
PRN.  
  
  
Benign essential hypertension   
(CMS/HCC) - Primary  
BP controlled and monitor PRN.  
  
  
RED (generalized anxiety disorder)   
(CMS/HCC)  
Symptoms tolerable with wellbutrin and   
continue.  
  
  
Gastroesophageal reflux disease   
without esophagitis  
Symptoms controlled with pepcid and   
continue.  
  
  
MDD (major depressive disorder),   
recurrent episode, mild (HCC)   
(CMS/HCC)  
Symptoms tolerable with wellbutrin and   
continue.  
  
  
Primary insomnia  
Sleeping well with restoril and   
continue.  
  
  
  
  
Electronically signed by Jonas Daniel MD at 2024 2:07 PM EST  
documented in this encounter            [facility]  
  
  
  
Evaluation note  
  
  
                                Note Date & Type Note            Facility  
  
  
  
                                                    Evaluation note   
  
  
  
                                                    Diagnosis  
   
                                                      
  
  
Women's annual routine gynecological examination  
  
documented in this encounter  
[facility] Phone: [phone]  
  
Evaluation note  
  
  
                                Note Date & Type Note            Facility  
  
  
  
                                                    Evaluation note   
  
  
  
                                                    Diagnosis  
   
                                                      
  
  
Benign essential hypertension (CMS/HCC)- Primary  
  
  
Essential hypertension, benign  
   
                                                      
  
  
Lumbar spondylosis  
  
  
Lumbosacral spondylosis without myelopathy  
   
                                                      
  
  
MDD (major depressive disorder), recurrent episode, mild (HCC) (CMS/[facility])  
   
                                                      
  
  
RED (generalized anxiety disorder) (CMS/[facility])  
  
  
Generalized anxiety disorder  
   
                                                      
  
  
Primary insomnia  
  
  
Persistent disorder of initiating or maintaining sleep  
   
                                                      
  
  
Gastroesophageal reflux disease without esophagitis  
  
  
Esophageal reflux  
  
documented in this encounter  
[facility]  
  
Evaluation note  
  
  
                                Note Date & Type Note            Facility  
  
  
  
                                                    Evaluation note   
  
  
  
                                                    Diagnosis  
   
                                                      
  
  
Lumbar spondylosis  
  
  
Lumbosacral spondylosis without myelopathy  
  
documented in this encounter  
[facility]  
  
Summary Purpose  
  
  
                                                      
  
  
  
Family History  
No Family History Records FoundNo Family History Records FoundNo Family History 
Records Found  
  
Advance Directives  
No Advanced Directives Records FoundNo Advanced Directives Records FoundNo 
Advanced Directives Records Found  
  
Additional Source Comments  
  
  
  
                                                    Care Teams (unrecognized sec  
tion and content)  
   
                                          
  
  
  
                      Team Member Relationship Specialty  Start Date End Date  
   
                                                      
  
  
Jonas Daniel MD  
  
  
[address] [place], [place] [id number] [phone] (Work)  
  
  
[phone] (Fax) PCP - General   Family Medicine 17          
  
  
  
                      Team Member Relationship Specialty  Start Date End Date  
   
                                                      
  
  
Jonas Daniel MD  
  
  
NPI: [id number]  
  
  
[address] [place] [id number] [phone] (Work)  
  
  
[phone] (Fax) PCP - General   Family [facility] 24            
  
  
  
                      Team Member Relationship Specialty  Start Date End Date  
   
                                                      
  
  
Jonas Daniel MD  
  
  
NPI: [id number]  
  
  
[address] [place] [id number] [phone] (Work)  
  
  
[phone] (Fax) PCP - General   Family Medicine 24            
  
  
  
  
  
                                                    INFORMATION SOURCE (unrecogn  
ized section and content)  
   
                                          
  
  
  
                                        DATE CREATED        AUTHOR  
   
                                10/13/2022                      [facility]  
pital  
  
  
  
                                DATE CREATED    AUTHOR          AUTHOR'S ORGANIZ  
ATION  
   
                                2022                      [facility]  
pital  
  
  
  
                                DATE CREATED    AUTHOR          AUTHOR'S ORGANIZ  
ATION  
   
                                2024                      [facility]  
[facility]  
  
  
  
  
  
                                                    Reason for Visit (unrecogniz  
ed section and content)  
   
                                          
  
  
  
                                        Reason              Comments  
   
                                        Follow-up           2 w  
  
  
  
                                Reason          Onset Date      Comments  
   
                                Med Refill      2024        
  
  
